# Patient Record
(demographics unavailable — no encounter records)

---

## 2024-10-17 NOTE — PHYSICAL EXAM
[NL] : warm, clear [Playful] : playful [Clear Rhinorrhea] : clear rhinorrhea [FreeTextEntry7] : WET COUGH, CHEST INITIALLY CONGESTED SOUNDING, CLEARED WITH COUGH AND CHEST PT

## 2024-10-17 NOTE — PLAN
[TextEntry] : SUPPORTIVE CARE F/U FLU/RSV/COVID-19 PCR QUARANTINE PENDING COVID PCR RESULTS F/U FOR NEW OR ACUTE WORSENING SYMPTOMS

## 2024-12-21 NOTE — HISTORY OF PRESENT ILLNESS
[de-identified] : COUGH, RUNNY NOSE  [FreeTextEntry6] : x 1wk started w/clear d/c now thicker, more copious, opaque wakes from cough afebrile

## 2024-12-23 NOTE — REVIEW OF SYSTEMS
[Fever] : fever [Fussy] : fussy [Malaise] : no malaise [Nasal Discharge] : nasal discharge [Nasal Congestion] : nasal congestion [Tachypnea] : not tachypneic [Wheezing] : no wheezing [Cough] : cough [Negative] : Genitourinary

## 2024-12-23 NOTE — HISTORY OF PRESENT ILLNESS
[de-identified] : POSSIBLE EAR INFECTION, FEVER, RUNNY NOSE. PT SAYS HIS INNER MOUTH HURTS.; CURRENTLY DAY 3OF ABX

## 2024-12-23 NOTE — PHYSICAL EXAM
[EOMI] : grossly EOMI [Clear Rhinorrhea] : clear rhinorrhea [NL] : warm, clear [de-identified] : NO ORAL LESIONS NOTED

## 2024-12-23 NOTE — PLAN
[TextEntry] : CONT. PRESENT MANAGEMENT AND ADEQUATE HYDRATION; DISCUSSED WHEN SHOULD SEEK FURTHER CARE; RTC AS RECOMMENDED

## 2025-03-05 NOTE — PHYSICAL EXAM
[NL] : warm, clear [FreeTextEntry1] : HYPERKENESIS, WILL NOT STOP MOVING THROJGHOUT VISIT (TOOK ALBUTEROL ~1HR AGO)

## 2025-03-27 NOTE — PLAN
[TextEntry] : AGE dietary guidelines reviewed signs of dehydration increased hand hygiene imodium prn ok to ff  offered montelukast / flovent wishes to see see pulm first given referrals to ff

## 2025-03-27 NOTE — HISTORY OF PRESENT ILLNESS
[de-identified] : DIARRHEA SINCE TUESDAY. DIAGNOSED WITH CROUP LAST WEEK. [FreeTextEntry6] : resolved croup on prednisolone now w/copious diarrhea no vomiting, no abd pain afebrile  also, several episodes of wheezing after colds